# Patient Record
Sex: FEMALE | Race: WHITE | NOT HISPANIC OR LATINO
[De-identification: names, ages, dates, MRNs, and addresses within clinical notes are randomized per-mention and may not be internally consistent; named-entity substitution may affect disease eponyms.]

---

## 2020-10-06 PROBLEM — Z00.00 ENCOUNTER FOR PREVENTIVE HEALTH EXAMINATION: Status: ACTIVE | Noted: 2020-10-06

## 2020-10-19 ENCOUNTER — APPOINTMENT (OUTPATIENT)
Dept: RADIATION ONCOLOGY | Facility: CLINIC | Age: 70
End: 2020-10-19
Payer: MEDICARE

## 2020-10-19 VITALS
TEMPERATURE: 98 F | HEIGHT: 59 IN | RESPIRATION RATE: 12 BRPM | SYSTOLIC BLOOD PRESSURE: 134 MMHG | OXYGEN SATURATION: 99 % | HEART RATE: 76 BPM | DIASTOLIC BLOOD PRESSURE: 84 MMHG | WEIGHT: 169 LBS | BODY MASS INDEX: 34.07 KG/M2

## 2020-10-19 DIAGNOSIS — Z86.79 PERSONAL HISTORY OF OTHER DISEASES OF THE CIRCULATORY SYSTEM: ICD-10-CM

## 2020-10-19 DIAGNOSIS — Z78.9 OTHER SPECIFIED HEALTH STATUS: ICD-10-CM

## 2020-10-19 DIAGNOSIS — Z80.3 FAMILY HISTORY OF MALIGNANT NEOPLASM OF BREAST: ICD-10-CM

## 2020-10-19 DIAGNOSIS — Z86.39 PERSONAL HISTORY OF OTHER ENDOCRINE, NUTRITIONAL AND METABOLIC DISEASE: ICD-10-CM

## 2020-10-19 DIAGNOSIS — Z87.828 PERSONAL HISTORY OF OTHER (HEALED) PHYSICAL INJURY AND TRAUMA: ICD-10-CM

## 2020-10-19 DIAGNOSIS — Z87.891 PERSONAL HISTORY OF NICOTINE DEPENDENCE: ICD-10-CM

## 2020-10-19 PROCEDURE — 99204 OFFICE O/P NEW MOD 45 MIN: CPT

## 2020-10-19 NOTE — VITALS
[Least Pain Intensity: 0/10] : 0/10 [Maximal Pain Intensity: 0/10] : 0/10 [70: Cares for self; unalbe to carry on normal activity or do active work.] : 70: Cares for self; unable to carry on normal activity or do active work. [ECOG Performance Status: 2 - Ambulatory and capable of all self care but unable to carry out any work activities] : Performance Status: 2 - Ambulatory and capable of all self care but unable to carry out any work activities. Up and about more than 50% of waking hours [80: Active, but tires more quickly] : 80: Active, but tires more quickly [Date: ____________] : Patient's last distress assessment performed on [unfilled]. [1 - Distress Level] : Distress Level: 1

## 2020-10-19 NOTE — REVIEW OF SYSTEMS
[Constipation: Grade 0] : Constipation: Grade 0 [Diarrhea: Grade 0] : Diarrhea: Grade 0 [Nausea: Grade 0] : Nausea: Grade 0 [Vomiting: Grade 0] : Vomiting: Grade 0 [Hematuria: Grade 0] : Hematuria: Grade 0 [Urinary Incontinence: Grade 0] : Urinary Incontinence: Grade 0  [Urinary Retention: Grade 0] : Urinary Retention: Grade 0 [Urinary Urgency: Grade 0] : Urinary Urgency: Grade 0 [Urinary Tract Pain: Grade 0] : Urinary Tract Pain: Grade 0 [Urinary Frequency: Grade 0] : Urinary Frequency: Grade 0

## 2020-10-20 NOTE — PHYSICAL EXAM
[Normal External Genitalia] : normal external genitalia  [Normal] : oriented to person, place and time, the affect was normal, the mood was normal and not anxious [FreeTextEntry1] : No mass palpable in the rectovaginal septum [de-identified] : Vaginal mucosa looks healthy.  No lesions palpable near the apex or on the sidewalls.  No pelvic mass palpable.  No blood on examining finger

## 2020-10-20 NOTE — HISTORY OF PRESENT ILLNESS
[FreeTextEntry1] : Ms. Mancini is a 70 year old female referred here by Dr. Wertheim with high-grade serous carcinoma of the endometrium. The patient presented to Dr. Hoff in August of 2020 with post menopausal vaginal bleeding. She had an endometrial biopsy done by Dr. Hoff on 8/20/2020 which revealed endometrial adenocarcinoma with papillary serous and focal clear cell cytoplasm features. She was then referred to Dr. Wertheim and had a robotic CARMEN/BSO on 9/15/2020. \par \par She had a Robotic CARMEN/BSO, staging on 9/15/2020\par Tumor Site:  Endometrium\par Tumor Size(cm):  Multiple fragments, the largest 1.0 cm\par Histologic Type:  Serous carcinoma\par Histologic Grade:  N/A\par Myometrial Invasion:  Not identified\par Uterine Serosa Involvement:  Not identified\par Lower Uterine Segment Involvement:  Not identified\par Cervical Stromal Involvement:  Not identified\par Parametrial Involvement:  Not identified\par Other Tissue/Organ Involvement:  Detached fragments of serous carcinoma are seen in the lumens of both fallopian tubes (Intraluminal tumor cells).\par \par Pelvic Washings:  Positive ()\par Margins:  Negative\par Lymphovascular Invasion:  Not identified\par Mismatch Repair Testing (MMR) by IHC:  Not performed as there are only small fragments of carcinoma, the largest with necrosis.  Recommend doing MMR testing on patient’s previous endometrial biopsy.\par \par Regional Lymph Nodes:  All Lymph Nodes negative for tumor cells\par Number of Lymph Nodes Examined:  7\par Total Number of Pelvic Nodes Examined:  6\par Number of Pelvic [place] Nodes Examined:  6\par Total Number of Para-aortic Nodes Examined:  1\par Number of Para-aortic [place] Nodes Examined:  1\par \par Distant Metastasis confirmed pathologically:  Negative Omentum\par \par Additional Pathologic Findings:  Benign endometrial polyp; Uterine leiomyomas\par \par Pathologic Staging (pTNM,AJCC 8th ed):  pT1a N0(sn) Stage IA\par Positive pelvic washings and intraluminal involvement of fallopian tubes does not alter staging.\par \par FINAL PATHOLOGIC DIAGNOSIS\par A. UTERUS, CERVIX, BILATERAL FALLOPIAN TUBES AND OVARIES (72 GRAMS):  ROBOTIC TOTAL ABDOMINAL HYSTERECTOMY WITH BILATERAL SALPINGO-OOPHORECTOMIES\par -  SEROUS CARCINOMA OF THE ENDOMETRIUM\par 	-  HIGH GRADE\par -  SEVERAL SMALL FOCI DETACHED WITHIN THE ENDOMETRIAL CAVITY, WITHIN THE LUMEN OF BOTH FALLOPIAN TUBES (INTRALUMINAL TUMOR CELLS) AND SEEN LOOSELY ADHERENT TO THE ENDOMETRIUM MICROSCOPICALLY\par -  LARGEST FOCUS (10 MM) IS DETACHED IN THE ENDOMETRIAL CAVITY AND SHOWS EXTENSIVE NECROSIS\par -  REMAINING FOCI RANGE FROM 1 MM UP TO 3 MM\par -  NEGATIVE FOR MYOMETRIAL INVASION\par -  NEGATIVE FOR ENDOCERVICAL INVOLVEMENT\par -  NEGATIVE FOR LYMPHOVASCULAR INVASION\par -  P53 IMMUNOHISTOCHEMICAL STAIN IS POSITIVE, SUPPORTING THE DIAGNOSIS\par -  MISMATCH REPAIR (MMR) TESTING IS BEST DONE ON THE PATIENT’S ENDOMETRIAL BIOPSY SPECIMEN ([place] [id number]) AS THE RESIDUAL FOCI IN THE CURRENT SPECIMEN ARE SMALL AND SOME ARE NECROTIC\par -  BILATERAL FALLOPIAN TUBES WITH INTRALUMINAL SEROUS CARCINOMA (INTRALUMINAL TUMOR CELLS), CONSISTENT WITH ENDOMETRIAL ORIGIN, THE LARGEST FRAGMENT IS 1.5 MM\par -  PELVIC WASHINGS ():  POSITIVE\par 	-  BILATERAL PARAMETRIAL TISSUE, NEGATIVE FOR CARCINOMA\par 	-  BENIGN ENDOMETRIAL POLYP (1.1 CM)\par 	-  INACTIVE ENDOMETRIUM WITH CYSTIC CHANGE\par 	-  UTERINE LEIOMYOMAS, INTRAMURAL AND SUBSEROSAL, THE LARGEST 2 CM\par -  BILATERAL BENIGN OVARIES\par -  BENIGN CERVIX WITH SMALL MUCOUS CYSTS\par \par B. OMENTUM: OMENTECTOMY\par -  OMENTAL TISSUE, NEGATIVE FOR CARCINOMA\par \par C. LEFT EXTERNAL ILIAC SENTINEL NODES:\par -  3 LYMPH NODES, NEGATIVE FOR CARCINOMA ON H&E AND CYTOKERATIN STAINS (0/3)\par D. RIGHT PELVIC SENTINEL NODES:\par -  3 LYMPH NODES, NEGATIVE FOR CARCINOMA ON H&E AND CYTOKERATIN STAINS (0/3) \par E. RIGHT PARA-AORTIC SENTINEL NODES:\par -  1 LYMPH NODE, NEGATIVE FOR CARCINOMA ON H&E AND CYTOKERATIN STAINS (0/1)\par  \par Cancer Staging \par  Pathologic Staging (pTNM,AJCC 8th ed):  pT1a N0(sn) Stage IA\par Positive pelvic washings and intraluminal involvement of fallopian tubes does not alter staging. \par \par On 9/4/2020 she had a CT of her chest, abdomen, and pelvis which revealed no CT evidence of metastatic disease. No lymphadenopathy or ascites. Minimal reticular nodular densities and ground glass changes left upper lobe and superior segment left lower lobe, most likely infectious inflammatory. Incidental cholelithiasis, punctate nodule in the upper pole renal calculi. \par \par She saw Dr. Dumont on 10/9/2020. She recommended 6 cycles of carboplatin and paclitaxel. Her Mother's has Ashkenazi [employer] Heritage, she has been referred to genetic counseling. \par \par She is here to discuss vaginal cuff brachytherapy. Per Dr. Wertheim's note the patient may be getting a second opinion from [facility] Dr. Gil per her families request. \par \par She denies bleeding post surgery, other than some vaginal spotting. Denies urinary or bowel issues.

## 2020-10-20 NOTE — OB/GYN HISTORY
[History of Birth Control Pills] : Patient has a history of taking birth control pills [Menopause Age: ____] : patient was [unfilled] years old at menopause [___] : Living: [unfilled] [History of Hormone Replacement Therapy] : no history of hormone replacement therapy

## 2020-10-27 ENCOUNTER — APPOINTMENT (OUTPATIENT)
Dept: HEMATOLOGY ONCOLOGY | Facility: CLINIC | Age: 70
End: 2020-10-27
Payer: MEDICARE

## 2020-10-27 PROCEDURE — 99499A: CUSTOM | Mod: NC

## 2020-10-28 ENCOUNTER — TRANSCRIPTION ENCOUNTER (OUTPATIENT)
Age: 70
End: 2020-10-28

## 2020-11-11 ENCOUNTER — NON-APPOINTMENT (OUTPATIENT)
Age: 70
End: 2020-11-11

## 2020-12-18 ENCOUNTER — APPOINTMENT (OUTPATIENT)
Dept: RADIATION ONCOLOGY | Facility: CLINIC | Age: 70
End: 2020-12-18
Payer: MEDICARE

## 2020-12-18 VITALS
RESPIRATION RATE: 12 BRPM | DIASTOLIC BLOOD PRESSURE: 78 MMHG | BODY MASS INDEX: 32.66 KG/M2 | SYSTOLIC BLOOD PRESSURE: 120 MMHG | TEMPERATURE: 98 F | HEIGHT: 59 IN | OXYGEN SATURATION: 99 % | HEART RATE: 78 BPM | WEIGHT: 162 LBS

## 2020-12-18 PROCEDURE — 99213 OFFICE O/P EST LOW 20 MIN: CPT | Mod: 25

## 2020-12-18 NOTE — REVIEW OF SYSTEMS
[Anal Pain: Grade 0] : Anal Pain: Grade 0 [Constipation: Grade 1 - Occasional or intermittent symptoms; occasional use of stool softeners, laxatives, dietary modification, or enema] : Constipation: Grade 1 - Occasional or intermittent symptoms; occasional use of stool softeners, laxatives, dietary modification, or enema [Diarrhea: Grade 1 - Increase of <4 stools per day over baseline; mild increase in ostomy output compared to baseline] : Diarrhea: Grade 1 - Increase of <4 stools per day over baseline; mild increase in ostomy output compared to baseline [Dyspepsia: Grade 0] : Dyspepsia: Grade 0 [Dysphagia: Grade 0] : Dysphagia: Grade 0 [Esophagitis: Grade 0] : Esophagitis: Grade 0 [Fecal Incontinence: Grade 0] : Fecal Incontinence: Grade 0 [Gastroparesis: Grade 0] : Gastroparesis: Grade 0 [Nausea: Grade 0] : Nausea: Grade 0 [Proctitis: Grade 0] : Proctitis: Grade 0 [Rectal Pain: Grade 0] : Rectal Pain: Grade 0 [Small Intestinal Obstruction: Grade 0] : Small Intestinal Obstruction: Grade 0 [Vomiting: Grade 0] : Vomiting: Grade 0 [Fatigue: Grade 2 - Fatigue not relieved by rest; limiting instrumental ADL] : Fatigue: Grade 2 - Fatigue not relieved by rest; limiting instrumental ADL [Hematuria: Grade 0] : Hematuria: Grade 0 [Urinary Incontinence: Grade 0] : Urinary Incontinence: Grade 0  [Urinary Retention: Grade 0] : Urinary Retention: Grade 0 [Urinary Tract Pain: Grade 0] : Urinary Tract Pain: Grade 0 [Urinary Urgency: Grade 0] : Urinary Urgency: Grade 0 [Urinary Frequency: Grade 0] : Urinary Frequency: Grade 0 [Lethargy: Grade 0] : Lethargy: Grade 0 [Anxiety: Grade 0] : Anxiety: Grade 0  [Depression: Grade 0] : Depression: Grade 0 [Insomnia: Grade 0] : Insomnia: Grade 0 [FreeTextEntry3] : referred to [facility]

## 2020-12-18 NOTE — HISTORY OF PRESENT ILLNESS
[FreeTextEntry1] : Ms. Mancini is a 70 year old female referred here by Dr. Wertheim with high-grade serous carcinoma of the endometrium. The patient presented to Dr. Hoff in August of 2020 with post menopausal vaginal bleeding. She had an endometrial biopsy done by Dr. Hoff on 8/20/2020 which revealed endometrial adenocarcinoma with papillary serous and focal clear cell cytoplasm features. She was then referred to Dr. Wertheim and had a robotic CARMEN/BSO on 9/15/2020. \par \par She had a Robotic CARMEN/BSO, staging on 9/15/2020\par Tumor Site:  Endometrium\par Tumor Size(cm):  Multiple fragments, the largest 1.0 cm\par Histologic Type:  Serous carcinoma\par Histologic Grade:  N/A\par Myometrial Invasion:  Not identified\par Uterine Serosa Involvement:  Not identified\par Lower Uterine Segment Involvement:  Not identified\par Cervical Stromal Involvement:  Not identified\par Parametrial Involvement:  Not identified\par Other Tissue/Organ Involvement:  Detached fragments of serous carcinoma are seen in the lumens of both fallopian tubes (Intraluminal tumor cells).\par \par Pelvic Washings:  Positive ()\par Margins:  Negative\par Lymphovascular Invasion:  Not identified\par Mismatch Repair Testing (MMR) by IHC:  Not performed as there are only small fragments of carcinoma, the largest with necrosis.  Recommend doing MMR testing on patient’s previous endometrial biopsy.\par \par Regional Lymph Nodes:  All Lymph Nodes negative for tumor cells\par Number of Lymph Nodes Examined:  7\par Total Number of Pelvic Nodes Examined:  6\par Number of Pelvic [place] Nodes Examined:  6\par Total Number of Para-aortic Nodes Examined:  1\par Number of Para-aortic [place] Nodes Examined:  1\par \par Distant Metastasis confirmed pathologically:  Negative Omentum\par \par Additional Pathologic Findings:  Benign endometrial polyp; Uterine leiomyomas\par \par Pathologic Staging (pTNM,AJCC 8th ed):  pT1a N0(sn) Stage IA\par Positive pelvic washings and intraluminal involvement of fallopian tubes does not alter staging.\par \par FINAL PATHOLOGIC DIAGNOSIS\par A. UTERUS, CERVIX, BILATERAL FALLOPIAN TUBES AND OVARIES (72 GRAMS):  ROBOTIC TOTAL ABDOMINAL HYSTERECTOMY WITH BILATERAL SALPINGO-OOPHORECTOMIES\par -  SEROUS CARCINOMA OF THE ENDOMETRIUM\par 	-  HIGH GRADE\par -  SEVERAL SMALL FOCI DETACHED WITHIN THE ENDOMETRIAL CAVITY, WITHIN THE LUMEN OF BOTH FALLOPIAN TUBES (INTRALUMINAL TUMOR CELLS) AND SEEN LOOSELY ADHERENT TO THE ENDOMETRIUM MICROSCOPICALLY\par -  LARGEST FOCUS (10 MM) IS DETACHED IN THE ENDOMETRIAL CAVITY AND SHOWS EXTENSIVE NECROSIS\par -  REMAINING FOCI RANGE FROM 1 MM UP TO 3 MM\par -  NEGATIVE FOR MYOMETRIAL INVASION\par -  NEGATIVE FOR ENDOCERVICAL INVOLVEMENT\par -  NEGATIVE FOR LYMPHOVASCULAR INVASION\par -  P53 IMMUNOHISTOCHEMICAL STAIN IS POSITIVE, SUPPORTING THE DIAGNOSIS\par -  MISMATCH REPAIR (MMR) TESTING IS BEST DONE ON THE PATIENT’S ENDOMETRIAL BIOPSY SPECIMEN ([place] [id number]) AS THE RESIDUAL FOCI IN THE CURRENT SPECIMEN ARE SMALL AND SOME ARE NECROTIC\par -  BILATERAL FALLOPIAN TUBES WITH INTRALUMINAL SEROUS CARCINOMA (INTRALUMINAL TUMOR CELLS), CONSISTENT WITH ENDOMETRIAL ORIGIN, THE LARGEST FRAGMENT IS 1.5 MM\par -  PELVIC WASHINGS ():  POSITIVE\par 	-  BILATERAL PARAMETRIAL TISSUE, NEGATIVE FOR CARCINOMA\par 	-  BENIGN ENDOMETRIAL POLYP (1.1 CM)\par 	-  INACTIVE ENDOMETRIUM WITH CYSTIC CHANGE\par 	-  UTERINE LEIOMYOMAS, INTRAMURAL AND SUBSEROSAL, THE LARGEST 2 CM\par -  BILATERAL BENIGN OVARIES\par -  BENIGN CERVIX WITH SMALL MUCOUS CYSTS\par \par B. OMENTUM: OMENTECTOMY\par -  OMENTAL TISSUE, NEGATIVE FOR CARCINOMA\par \par C. LEFT EXTERNAL ILIAC SENTINEL NODES:\par -  3 LYMPH NODES, NEGATIVE FOR CARCINOMA ON H&E AND CYTOKERATIN STAINS (0/3)\par D. RIGHT PELVIC SENTINEL NODES:\par -  3 LYMPH NODES, NEGATIVE FOR CARCINOMA ON H&E AND CYTOKERATIN STAINS (0/3) \par E. RIGHT PARA-AORTIC SENTINEL NODES:\par -  1 LYMPH NODE, NEGATIVE FOR CARCINOMA ON H&E AND CYTOKERATIN STAINS (0/1)\par  \par Cancer Staging \par  Pathologic Staging (pTNM,AJCC 8th ed):  pT1a N0(sn) Stage IA\par Positive pelvic washings and intraluminal involvement of fallopian tubes does not alter staging. \par \par On 9/4/2020 she had a CT of her chest, abdomen, and pelvis which revealed no CT evidence of metastatic disease. No lymphadenopathy or ascites. Minimal reticular nodular densities and ground glass changes left upper lobe and superior segment left lower lobe, most likely infectious inflammatory. Incidental cholelithiasis, punctate nodule in the upper pole renal calculi. \par \par She saw Dr. Dumont on 10/9/2020. She recommended 6 cycles of carboplatin and paclitaxel. Her Mother's has Ashkenazi Confuc[employer]ism Heritage, she has been referred to genetic counseling. \par \par She is here to discuss vaginal cuff brachytherapy. Per Dr. Wertheim's note the patient may be getting a second opinion from [facility] Dr. Gil per her families request. She denies bleeding post surgery, other than some vaginal spotting. Denies urinary or bowel issues. \par \par \par 12/18/2020\par Patient was last seen in the office on 10/19/2020 at which time the impression and plan was as follows:\par Stage Ia adenocarcinoma of endometrium, status post CARMEN/BSO procedure. We had a long discussion with the patient regarding the management. We reviewed the pathology that showed a high-grade serous carcinoma with implants noted in fallopian tube as well as in the cavity of uterus. Given her pathology, we recommended that treating her with systemic chemotherapy to improve survival. We also favored using high-dose-rate brachytherapy to the upper part of the vagina to decrease local recurrence. We recommended sandwiching the radiation therapy in between her third and fourth cycle of systemic chemotherapy. We discussed the technical details including the need for insertion of customized applicator, CT simulation, treatment planning, verification and delivery. We recommended treating her to a dose of 2100 cGy in 3 fractions. We discussed the expected short-term side effects including burning sensation, increased frequency of urination and nausea. We discussed the long-term side effects including the risk of vaginal shortening, injury to the bladder, rectal ulceration and subacute intestinal obstruction. We indicated a 1 to 2% probability of grade 3 or higher delayed toxicity. \par \par \par She has completed 3 cycles of chemotherapy,carboplatin and paclitaxel, with Dr. Dumont and she is here to get started on radiation prior to completing her next 3 cycles of chemotherapy. She has been tolerating the chemotherapy well, she complains of increased fatigue. Denies pain. She alternates between diarrhea and constipation. She denies any urinary issues. No BLE edema present. Denies any N/V at this time.

## 2020-12-18 NOTE — PHYSICAL EXAM
[Normal] : oriented to person, place and time, the affect was normal, the mood was normal and not anxious [de-identified] : External genitalia looks normal.  No lesions noted in the vagina.  No parametrial mass noted.  No blood in examining finger

## 2021-03-12 ENCOUNTER — APPOINTMENT (OUTPATIENT)
Dept: RADIATION ONCOLOGY | Facility: CLINIC | Age: 71
End: 2021-03-12

## 2021-03-23 ENCOUNTER — APPOINTMENT (OUTPATIENT)
Dept: RADIATION ONCOLOGY | Facility: CLINIC | Age: 71
End: 2021-03-23
Payer: MEDICARE

## 2021-03-23 VITALS
OXYGEN SATURATION: 100 % | HEART RATE: 72 BPM | RESPIRATION RATE: 14 BRPM | HEIGHT: 59 IN | DIASTOLIC BLOOD PRESSURE: 86 MMHG | SYSTOLIC BLOOD PRESSURE: 140 MMHG | WEIGHT: 151 LBS | TEMPERATURE: 98 F | BODY MASS INDEX: 30.44 KG/M2

## 2021-03-23 PROCEDURE — 99212 OFFICE O/P EST SF 10 MIN: CPT

## 2021-03-23 NOTE — PHYSICAL EXAM
[Normal External Genitalia] : normal external genitalia  [Normal Vaginal Cuff] : vaginal cuff without lesion or nodularity [Normal] : oriented to person, place and time, the affect was normal, the mood was normal and not anxious [FreeTextEntry1] : Rectal mucosa smooth no mass palpable in the rectovaginal septum.  No blood in examining finger [de-identified] : No pelvic mass noted.  No blood in examining finger

## 2021-03-23 NOTE — HISTORY OF PRESENT ILLNESS
[FreeTextEntry1] : Ms. Mancini is a 70 year old female referred here by Dr. Wertheim with high-grade serous carcinoma of the endometrium. The patient presented to Dr. Hoff in August of 2020 with post menopausal vaginal bleeding. She had an endometrial biopsy done by Dr. Hoff on 8/20/2020 which revealed endometrial adenocarcinoma with papillary serous and focal clear cell cytoplasm features. She was then referred to Dr. Wertheim and had a robotic CARMEN/BSO on 9/15/2020. \par \par She had a Robotic CARMEN/BSO, staging on 9/15/2020\par Tumor Site:  Endometrium\par Tumor Size(cm):  Multiple fragments, the largest 1.0 cm\par Histologic Type:  Serous carcinoma\par Histologic Grade:  N/A\par Myometrial Invasion:  Not identified\par Uterine Serosa Involvement:  Not identified\par Lower Uterine Segment Involvement:  Not identified\par Cervical Stromal Involvement:  Not identified\par Parametrial Involvement:  Not identified\par Other Tissue/Organ Involvement:  Detached fragments of serous carcinoma are seen in the lumens of both fallopian tubes (Intraluminal tumor cells).\par \par Pelvic Washings:  Positive ()\par Margins:  Negative\par Lymphovascular Invasion:  Not identified\par Mismatch Repair Testing (MMR) by IHC:  Not performed as there are only small fragments of carcinoma, the largest with necrosis.  Recommend doing MMR testing on patient’s previous endometrial biopsy.\par \par Regional Lymph Nodes:  All Lymph Nodes negative for tumor cells\par Number of Lymph Nodes Examined:  7\par Total Number of Pelvic Nodes Examined:  6\par Number of Pelvic [place] Nodes Examined:  6\par Total Number of Para-aortic Nodes Examined:  1\par Number of Para-aortic [place] Nodes Examined:  1\par \par Distant Metastasis confirmed pathologically:  Negative Omentum\par \par Additional Pathologic Findings:  Benign endometrial polyp; Uterine leiomyomas\par \par Pathologic Staging (pTNM,AJCC 8th ed):  pT1a N0(sn) Stage IA\par Positive pelvic washings and intraluminal involvement of fallopian tubes does not alter staging.\par \par FINAL PATHOLOGIC DIAGNOSIS\par A. UTERUS, CERVIX, BILATERAL FALLOPIAN TUBES AND OVARIES (72 GRAMS):  ROBOTIC TOTAL ABDOMINAL HYSTERECTOMY WITH BILATERAL SALPINGO-OOPHORECTOMIES\par -  SEROUS CARCINOMA OF THE ENDOMETRIUM\par 	-  HIGH GRADE\par -  SEVERAL SMALL FOCI DETACHED WITHIN THE ENDOMETRIAL CAVITY, WITHIN THE LUMEN OF BOTH FALLOPIAN TUBES (INTRALUMINAL TUMOR CELLS) AND SEEN LOOSELY ADHERENT TO THE ENDOMETRIUM MICROSCOPICALLY\par -  LARGEST FOCUS (10 MM) IS DETACHED IN THE ENDOMETRIAL CAVITY AND SHOWS EXTENSIVE NECROSIS\par -  REMAINING FOCI RANGE FROM 1 MM UP TO 3 MM\par -  NEGATIVE FOR MYOMETRIAL INVASION\par -  NEGATIVE FOR ENDOCERVICAL INVOLVEMENT\par -  NEGATIVE FOR LYMPHOVASCULAR INVASION\par -  P53 IMMUNOHISTOCHEMICAL STAIN IS POSITIVE, SUPPORTING THE DIAGNOSIS\par -  MISMATCH REPAIR (MMR) TESTING IS BEST DONE ON THE PATIENT’S ENDOMETRIAL BIOPSY SPECIMEN ([place] [id number]) AS THE RESIDUAL FOCI IN THE CURRENT SPECIMEN ARE SMALL AND SOME ARE NECROTIC\par -  BILATERAL FALLOPIAN TUBES WITH INTRALUMINAL SEROUS CARCINOMA (INTRALUMINAL TUMOR CELLS), CONSISTENT WITH ENDOMETRIAL ORIGIN, THE LARGEST FRAGMENT IS 1.5 MM\par -  PELVIC WASHINGS ():  POSITIVE\par 	-  BILATERAL PARAMETRIAL TISSUE, NEGATIVE FOR CARCINOMA\par 	-  BENIGN ENDOMETRIAL POLYP (1.1 CM)\par 	-  INACTIVE ENDOMETRIUM WITH CYSTIC CHANGE\par 	-  UTERINE LEIOMYOMAS, INTRAMURAL AND SUBSEROSAL, THE LARGEST 2 CM\par -  BILATERAL BENIGN OVARIES\par -  BENIGN CERVIX WITH SMALL MUCOUS CYSTS\par \par B. OMENTUM: OMENTECTOMY\par -  OMENTAL TISSUE, NEGATIVE FOR CARCINOMA\par \par C. LEFT EXTERNAL ILIAC SENTINEL NODES:\par -  3 LYMPH NODES, NEGATIVE FOR CARCINOMA ON H&E AND CYTOKERATIN STAINS (0/3)\par D. RIGHT PELVIC SENTINEL NODES:\par -  3 LYMPH NODES, NEGATIVE FOR CARCINOMA ON H&E AND CYTOKERATIN STAINS (0/3) \par E. RIGHT PARA-AORTIC SENTINEL NODES:\par -  1 LYMPH NODE, NEGATIVE FOR CARCINOMA ON H&E AND CYTOKERATIN STAINS (0/1)\par  \par Cancer Staging \par  Pathologic Staging (pTNM,AJCC 8th ed):  pT1a N0(sn) Stage IA\par Positive pelvic washings and intraluminal involvement of fallopian tubes does not alter staging. \par \par On 9/4/2020 she had a CT of her chest, abdomen, and pelvis which revealed no CT evidence of metastatic disease. No lymphadenopathy or ascites. Minimal reticular nodular densities and ground glass changes left upper lobe and superior segment left lower lobe, most likely infectious inflammatory. Incidental cholelithiasis, punctate nodule in the upper pole renal calculi. \par \par She saw Dr. Dumont on 10/9/2020. She recommended 6 cycles of carboplatin and paclitaxel. Her Mother's has [employer] Heritage, she has been referred to genetic counseling. \par \par She is here to discuss vaginal cuff brachytherapy. Per Dr. Wertheim's note the patient may be getting a second opinion from [facility] Dr. Gil per her families request. She denies bleeding post surgery, other than some vaginal spotting. Denies urinary or bowel issues. \par \par \par 12/18/2020\par Patient was last seen in the office on 10/19/2020 at which time the impression and plan was as follows:\par Stage Ia adenocarcinoma of endometrium, status post CARMEN/BSO procedure. We had a long discussion with the patient regarding the management. We reviewed the pathology that showed a high-grade serous carcinoma with implants noted in fallopian tube as well as in the cavity of uterus. Given her pathology, we recommended that treating her with systemic chemotherapy to improve survival. We also favored using high-dose-rate brachytherapy to the upper part of the vagina to decrease local recurrence. We recommended sandwiching the radiation therapy in between her third and fourth cycle of systemic chemotherapy. We discussed the technical details including the need for insertion of customized applicator, CT simulation, treatment planning, verification and delivery. We recommended treating her to a dose of 2100 cGy in 3 fractions. We discussed the expected short-term side effects including burning sensation, increased frequency of urination and nausea. We discussed the long-term side effects including the risk of vaginal shortening, injury to the bladder, rectal ulceration and subacute intestinal obstruction. We indicated a 1 to 2% probability of grade 3 or higher delayed toxicity. \par \par She has completed 3 cycles of chemotherapy,carboplatin and paclitaxel, with Dr. Dumont and she is here to get started on radiation prior to completing her next 3 cycles of chemotherapy. She has been tolerating the chemotherapy well, she complains of increased fatigue. Denies pain. She alternates between diarrhea and constipation. She denies any urinary issues. No BLE edema present. Denies any N/V at this time. \par \par 3/23/2021\par She completed 3 fractions, HDR 2100cGy on 1/19/21, she is here for a follow up.\par  Dr. Dumont and chemotherapy completed 4 weeks ago. She completed 6 cycles. She states she is feeling very weak and tires. The nausea and vomiting has subsided. She denies any pain, vaginal discharge or bleeding and urinary symptoms  She has a good appetite and is hydrating well. She is seeing Dr. Dumont on 4/9/2021\par

## 2021-03-23 NOTE — REVIEW OF SYSTEMS
[Nausea: Grade 0] : Nausea: Grade 0 [Vomiting: Grade 0] : Vomiting: Grade 0 [Fatigue: Grade 2 - Fatigue not relieved by rest; limiting instrumental ADL] : Fatigue: Grade 2 - Fatigue not relieved by rest; limiting instrumental ADL [Hematuria: Grade 0] : Hematuria: Grade 0 [Urinary Tract Pain: Grade 0] : Urinary Tract Pain: Grade 0 [Urinary Frequency: Grade 0] : Urinary Frequency: Grade 0 [Genital Edema: Grade 0] : Genital Edema: Grade 0 [Vaginal Infection: Grade 0] : Vaginal Infection: Grade 0

## 2021-03-23 NOTE — VITALS
[Maximal Pain Intensity: 0/10] : 0/10 [Least Pain Intensity: 0/10] : 0/10 [NoTreatment Scheduled] : no treatment scheduled [70: Cares for self; unalbe to carry on normal activity or do active work.] : 70: Cares for self; unable to carry on normal activity or do active work. [ECOG Performance Status: 2 - Ambulatory and capable of all self care but unable to carry out any work activities] : Performance Status: 2 - Ambulatory and capable of all self care but unable to carry out any work activities. Up and about more than 50% of waking hours

## 2021-09-14 ENCOUNTER — APPOINTMENT (OUTPATIENT)
Dept: RADIATION ONCOLOGY | Facility: CLINIC | Age: 71
End: 2021-09-14
Payer: MEDICARE

## 2021-09-14 VITALS
HEART RATE: 66 BPM | OXYGEN SATURATION: 99 % | DIASTOLIC BLOOD PRESSURE: 74 MMHG | TEMPERATURE: 98 F | SYSTOLIC BLOOD PRESSURE: 154 MMHG | WEIGHT: 150 LBS | BODY MASS INDEX: 30.24 KG/M2 | RESPIRATION RATE: 12 BRPM | HEIGHT: 59 IN

## 2021-09-14 PROCEDURE — 99212 OFFICE O/P EST SF 10 MIN: CPT

## 2021-09-14 RX ORDER — SEMAGLUTIDE 1.34 MG/ML
2 INJECTION, SOLUTION SUBCUTANEOUS
Refills: 0 | Status: ACTIVE | COMMUNITY

## 2021-09-14 RX ORDER — INSULIN HUMAN 100 [IU]/ML
INJECTION, SOLUTION PARENTERAL
Refills: 0 | Status: ACTIVE | COMMUNITY

## 2021-09-14 RX ORDER — LOSARTAN POTASSIUM 100 MG/1
TABLET, FILM COATED ORAL
Refills: 0 | Status: ACTIVE | COMMUNITY

## 2021-09-14 RX ORDER — ATORVASTATIN CALCIUM 80 MG/1
TABLET, FILM COATED ORAL
Refills: 0 | Status: ACTIVE | COMMUNITY

## 2021-09-14 RX ORDER — FLUOXETINE HYDROCHLORIDE 20 MG/1
20 CAPSULE ORAL
Refills: 0 | Status: ACTIVE | COMMUNITY

## 2021-09-14 RX ORDER — INSULIN GLARGINE 100 [IU]/ML
100 INJECTION, SOLUTION SUBCUTANEOUS
Refills: 0 | Status: ACTIVE | COMMUNITY

## 2021-09-14 NOTE — DISEASE MANAGEMENT
[Pathological] : TNM Stage: p [I] : I [TTNM] : 1 [NTNM] : 0 [MTNM] : 0 [de-identified] : 3 fractions 2100cGy on 1/19/21

## 2021-09-14 NOTE — HISTORY OF PRESENT ILLNESS
[FreeTextEntry1] : Ms. Mancini is a 70 year old female referred here by Dr. Wertheim with high-grade serous carcinoma of the endometrium. The patient presented to Dr. Hoff in August of 2020 with post menopausal vaginal bleeding. She had an endometrial biopsy done by Dr. Hoff on 8/20/2020 which revealed endometrial adenocarcinoma with papillary serous and focal clear cell cytoplasm features. She was then referred to Dr. Wertheim and had a robotic CARMEN/BSO on 9/15/2020. \par \par She had a Robotic CARMEN/BSO, staging on 9/15/2020\par Tumor Site:  Endometrium\par Tumor Size(cm):  Multiple fragments, the largest 1.0 cm\par Histologic Type:  Serous carcinoma\par Histologic Grade:  N/A\par Myometrial Invasion:  Not identified\par Uterine Serosa Involvement:  Not identified\par Lower Uterine Segment Involvement:  Not identified\par Cervical Stromal Involvement:  Not identified\par Parametrial Involvement:  Not identified\par Other Tissue/Organ Involvement:  Detached fragments of serous carcinoma are seen in the lumens of both fallopian tubes (Intraluminal tumor cells).\par \par Pelvic Washings:  Positive ()\par Margins:  Negative\par Lymphovascular Invasion:  Not identified\par Mismatch Repair Testing (MMR) by IHC:  Not performed as there are only small fragments of carcinoma, the largest with necrosis.  Recommend doing MMR testing on patient’s previous endometrial biopsy.\par \par Regional Lymph Nodes:  All Lymph Nodes negative for tumor cells\par Number of Lymph Nodes Examined:  7\par Total Number of Pelvic Nodes Examined:  6\par Number of Pelvic [place] Nodes Examined:  6\par Total Number of Para-aortic Nodes Examined:  1\par Number of Para-aortic [place] Nodes Examined:  1\par \par Distant Metastasis confirmed pathologically:  Negative Omentum\par \par Additional Pathologic Findings:  Benign endometrial polyp; Uterine leiomyomas\par \par Pathologic Staging (pTNM,AJCC 8th ed):  pT1a N0(sn) Stage IA\par Positive pelvic washings and intraluminal involvement of fallopian tubes does not alter staging.\par \par FINAL PATHOLOGIC DIAGNOSIS\par A. UTERUS, CERVIX, BILATERAL FALLOPIAN TUBES AND OVARIES (72 GRAMS):  ROBOTIC TOTAL ABDOMINAL HYSTERECTOMY WITH BILATERAL SALPINGO-OOPHORECTOMIES\par -  SEROUS CARCINOMA OF THE ENDOMETRIUM\par 	-  HIGH GRADE\par -  SEVERAL SMALL FOCI DETACHED WITHIN THE ENDOMETRIAL CAVITY, WITHIN THE LUMEN OF BOTH FALLOPIAN TUBES (INTRALUMINAL TUMOR CELLS) AND SEEN LOOSELY ADHERENT TO THE ENDOMETRIUM MICROSCOPICALLY\par -  LARGEST FOCUS (10 MM) IS DETACHED IN THE ENDOMETRIAL CAVITY AND SHOWS EXTENSIVE NECROSIS\par -  REMAINING FOCI RANGE FROM 1 MM UP TO 3 MM\par -  NEGATIVE FOR MYOMETRIAL INVASION\par -  NEGATIVE FOR ENDOCERVICAL INVOLVEMENT\par -  NEGATIVE FOR LYMPHOVASCULAR INVASION\par -  P53 IMMUNOHISTOCHEMICAL STAIN IS POSITIVE, SUPPORTING THE DIAGNOSIS\par -  MISMATCH REPAIR (MMR) TESTING IS BEST DONE ON THE PATIENT’S ENDOMETRIAL BIOPSY SPECIMEN ([place] [id number]) AS THE RESIDUAL FOCI IN THE CURRENT SPECIMEN ARE SMALL AND SOME ARE NECROTIC\par -  BILATERAL FALLOPIAN TUBES WITH INTRALUMINAL SEROUS CARCINOMA (INTRALUMINAL TUMOR CELLS), CONSISTENT WITH ENDOMETRIAL ORIGIN, THE LARGEST FRAGMENT IS 1.5 MM\par -  PELVIC WASHINGS ():  POSITIVE\par 	-  BILATERAL PARAMETRIAL TISSUE, NEGATIVE FOR CARCINOMA\par 	-  BENIGN ENDOMETRIAL POLYP (1.1 CM)\par 	-  INACTIVE ENDOMETRIUM WITH CYSTIC CHANGE\par 	-  UTERINE LEIOMYOMAS, INTRAMURAL AND SUBSEROSAL, THE LARGEST 2 CM\par -  BILATERAL BENIGN OVARIES\par -  BENIGN CERVIX WITH SMALL MUCOUS CYSTS\par \par B. OMENTUM: OMENTECTOMY\par -  OMENTAL TISSUE, NEGATIVE FOR CARCINOMA\par \par C. LEFT EXTERNAL ILIAC SENTINEL NODES:\par -  3 LYMPH NODES, NEGATIVE FOR CARCINOMA ON H&E AND CYTOKERATIN STAINS (0/3)\par D. RIGHT PELVIC SENTINEL NODES:\par -  3 LYMPH NODES, NEGATIVE FOR CARCINOMA ON H&E AND CYTOKERATIN STAINS (0/3) \par E. RIGHT PARA-AORTIC SENTINEL NODES:\par -  1 LYMPH NODE, NEGATIVE FOR CARCINOMA ON H&E AND CYTOKERATIN STAINS (0/1)\par  \par Cancer Staging \par  Pathologic Staging (pTNM,AJCC 8th ed):  pT1a N0(sn) Stage IA\par Positive pelvic washings and intraluminal involvement of fallopian tubes does not alter staging. \par \par On 9/4/2020 she had a CT of her chest, abdomen, and pelvis which revealed no CT evidence of metastatic disease. No lymphadenopathy or ascites. Minimal reticular nodular densities and ground glass changes left upper lobe and superior segment left lower lobe, most likely infectious inflammatory. Incidental cholelithiasis, punctate nodule in the upper pole renal calculi. \par \par She saw Dr. Dumont on 10/9/2020. She recommended 6 cycles of carboplatin and paclitaxel. Her Mother's has Ashkenazi Confucianist Heritage, she has been referred to genetic counseling. \par \par She is here to discuss vaginal cuff brachytherapy. Per Dr. Wertheim's note the patient may be getting a second opinion from [facility] Dr. Gil per her families request. She denies bleeding post surgery, other than some vaginal spotting. Denies urinary or bowel issues. \par \par \par 12/18/2020\par Patient was last seen in the office on 10/19/2020 at which time the impression and plan was as follows:\par Stage Ia adenocarcinoma of endometrium, status post CARMEN/BSO procedure. We had a long discussion with the patient regarding the management. We reviewed the pathology that showed a high-grade serous carcinoma with implants noted in fallopian tube as well as in the cavity of uterus. Given her pathology, we recommended that treating her with systemic chemotherapy to improve survival. We also favored using high-dose-rate brachytherapy to the upper part of the vagina to decrease local recurrence. We recommended sandwiching the radiation therapy in between her third and fourth cycle of systemic chemotherapy. We discussed the technical details including the need for insertion of customized applicator, CT simulation, treatment planning, verification and delivery. We recommended treating her to a dose of 2100 cGy in 3 fractions. We discussed the expected short-term side effects including burning sensation, increased frequency of urination and nausea. We discussed the long-term side effects including the risk of vaginal shortening, injury to the bladder, rectal ulceration and subacute intestinal obstruction. We indicated a 1 to 2% probability of grade 3 or higher delayed toxicity. \par \par She has completed 3 cycles of chemotherapy,carboplatin and paclitaxel, with Dr. Dumont and she is here to get started on radiation prior to completing her next 3 cycles of chemotherapy. She has been tolerating the chemotherapy well, she complains of increased fatigue. Denies pain. She alternates between diarrhea and constipation. She denies any urinary issues. No BLE edema present. Denies any N/V at this time. \par \par 3/23/2021\par She completed 3 fractions, HDR 2100cGy on 1/19/21, she is here for a follow up.\par  Dr. Dumont and chemotherapy completed 4 weeks ago. She completed 6 cycles. She states she is feeling very weak and tires. The nausea and vomiting has subsided. She denies any pain, vaginal discharge or bleeding and urinary symptoms  She has a good appetite and is hydrating well. She is seeing Dr. Dumont on 4/9/2021\par \par 9/14/2021\par Ms. Mancini presents today for a f/u.  Stage I high-grade serous endometrial cancer, status post CARMEN/BSO 6 cycles of chemotherapy and vaginal cuff brachytherapy completed on 1/19/2021. She was last seen in the office on 3/2021 with no evidence of disease. She is going to have a CT of the chest soon. She saw Dr. Dumont on 7/13/21 who is monitoring her CA-125 levels. She has not followed up with GYN oncology. She denies any pain, vaginal discharge, bleeding, urinary, or bowel symptoms. She eating and drinking well. She is not sexually active. \par

## 2021-09-14 NOTE — PHYSICAL EXAM
[Normal External Genitalia] : normal external genitalia  [Normal] : oriented to person, place and time, the affect was normal, the mood was normal and not anxious [FreeTextEntry1] : Rectal mucosa smooth.  No mass palpable in the rectovaginal septum [de-identified] : Vagina narrowed.  Smooth mucosal lining.  No ulceration or bleeding noted.  No pelvic mass palpable no blood on the examining finger

## 2021-09-14 NOTE — REVIEW OF SYSTEMS
[Anal Pain: Grade 0] : Anal Pain: Grade 0 [Constipation: Grade 0] : Constipation: Grade 0 [Diarrhea: Grade 0] : Diarrhea: Grade 0 [Dyspepsia: Grade 0] : Dyspepsia: Grade 0 [Dysphagia: Grade 0] : Dysphagia: Grade 0 [Esophagitis: Grade 0] : Esophagitis: Grade 0 [Fecal Incontinence: Grade 0] : Fecal Incontinence: Grade 0 [Gastroparesis: Grade 0] : Gastroparesis: Grade 0 [Rectal Pain: Grade 0] : Rectal Pain: Grade 0 [Nausea: Grade 0] : Nausea: Grade 0 [Small Intestinal Obstruction: Grade 0] : Small Intestinal Obstruction: Grade 0 [Vomiting: Grade 0] : Vomiting: Grade 0 [Edema Limbs: Grade 0] : Edema Limbs: Grade 0  [Fatigue: Grade 0] : Fatigue: Grade 0 [Hematuria: Grade 0] : Hematuria: Grade 0 [Urinary Incontinence: Grade 0] : Urinary Incontinence: Grade 0  [Urinary Retention: Grade 0] : Urinary Retention: Grade 0 [Urinary Tract Pain: Grade 0] : Urinary Tract Pain: Grade 0 [Urinary Urgency: Grade 0] : Urinary Urgency: Grade 0 [Urinary Frequency: Grade 0] : Urinary Frequency: Grade 0 [Genital Edema: Grade 0] : Genital Edema: Grade 0 [Vaginal Stricture: Grade 0] : Vaginal Stricture: Grade 0 [Vaginal Infection: Grade 0] : Vaginal Infection: Grade 0  [Breast Pain: Grade 0] : Breast Pain: Grade 0 [Dyspareunia: Grade 0] : Dyspareunia: Grade 0 [Dermatitis Radiation: Grade 0] : Dermatitis Radiation: Grade 0

## 2021-09-16 ENCOUNTER — NON-APPOINTMENT (OUTPATIENT)
Age: 71
End: 2021-09-16

## 2022-03-14 ENCOUNTER — APPOINTMENT (OUTPATIENT)
Dept: GYNECOLOGIC ONCOLOGY | Facility: CLINIC | Age: 72
End: 2022-03-14
Payer: MEDICARE

## 2022-03-14 PROCEDURE — 99205 OFFICE O/P NEW HI 60 MIN: CPT

## 2022-08-03 ENCOUNTER — APPOINTMENT (OUTPATIENT)
Dept: GYNECOLOGIC ONCOLOGY | Facility: CLINIC | Age: 72
End: 2022-08-03

## 2022-08-03 PROCEDURE — 99215 OFFICE O/P EST HI 40 MIN: CPT

## 2022-09-22 ENCOUNTER — APPOINTMENT (OUTPATIENT)
Dept: RADIATION ONCOLOGY | Facility: CLINIC | Age: 72
End: 2022-09-22

## 2022-09-22 VITALS
RESPIRATION RATE: 16 BRPM | BODY MASS INDEX: 37.03 KG/M2 | SYSTOLIC BLOOD PRESSURE: 139 MMHG | HEIGHT: 55 IN | HEART RATE: 81 BPM | DIASTOLIC BLOOD PRESSURE: 80 MMHG | WEIGHT: 160 LBS | OXYGEN SATURATION: 100 %

## 2022-09-22 PROCEDURE — 99212 OFFICE O/P EST SF 10 MIN: CPT

## 2022-09-22 NOTE — DISEASE MANAGEMENT
[Pathological] : TNM Stage: p [I] : I [TTNM] : 1 [NTNM] : 0 [MTNM] : 0 [de-identified] : 3 fractions 2100cGy on 1/19/21

## 2022-09-22 NOTE — PHYSICAL EXAM
[Normal External Genitalia] : normal external genitalia  [Normal Vaginal Cuff] : vaginal cuff without lesion or nodularity [Normal] : oriented to person, place and time, the affect was normal, the mood was normal and not anxious [FreeTextEntry1] : No mass palpable in rectovaginal septum.  No blood on the examining finger [de-identified] : No pelvic mass palpable.  No blood on the examining finger

## 2022-09-22 NOTE — HISTORY OF PRESENT ILLNESS
[FreeTextEntry1] : Ms. Mancini is a 70 year old female referred here by Dr. Wertheim with high-grade serous carcinoma of the endometrium. The patient presented to Dr. Hoff in August of 2020 with post menopausal vaginal bleeding. She had an endometrial biopsy done by Dr. Hoff on 8/20/2020 which revealed endometrial adenocarcinoma with papillary serous and focal clear cell cytoplasm features. She was then referred to Dr. Wertheim and had a robotic CARMEN/BSO on 9/15/2020. \par \par She had a Robotic CARMEN/BSO, staging on 9/15/2020\par Tumor Site:  Endometrium\par Tumor Size(cm):  Multiple fragments, the largest 1.0 cm\par Histologic Type:  Serous carcinoma\par Histologic Grade:  N/A\par Myometrial Invasion:  Not identified\par Uterine Serosa Involvement:  Not identified\par Lower Uterine Segment Involvement:  Not identified\par Cervical Stromal Involvement:  Not identified\par Parametrial Involvement:  Not identified\par Other Tissue/Organ Involvement:  Detached fragments of serous carcinoma are seen in the lumens of both fallopian tubes (Intraluminal tumor cells).\par \par Pelvic Washings:  Positive ()\par Margins:  Negative\par Lymphovascular Invasion:  Not identified\par Mismatch Repair Testing (MMR) by IHC:  Not performed as there are only small fragments of carcinoma, the largest with necrosis.  Recommend doing MMR testing on patient’s previous endometrial biopsy.\par \par Regional Lymph Nodes:  All Lymph Nodes negative for tumor cells\par Number of Lymph Nodes Examined:  7\par Total Number of Pelvic Nodes Examined:  6\par Number of Pelvic [place] Nodes Examined:  6\par Total Number of Para-aortic Nodes Examined:  1\par Number of Para-aortic [place] Nodes Examined:  1\par \par Distant Metastasis confirmed pathologically:  Negative Omentum\par \par Additional Pathologic Findings:  Benign endometrial polyp; Uterine leiomyomas\par \par Pathologic Staging (pTNM,AJCC 8th ed):  pT1a N0(sn) Stage IA\par Positive pelvic washings and intraluminal involvement of fallopian tubes does not alter staging.\par \par FINAL PATHOLOGIC DIAGNOSIS\par A. UTERUS, CERVIX, BILATERAL FALLOPIAN TUBES AND OVARIES (72 GRAMS):  ROBOTIC TOTAL ABDOMINAL HYSTERECTOMY WITH BILATERAL SALPINGO-OOPHORECTOMIES\par -  SEROUS CARCINOMA OF THE ENDOMETRIUM\par 	-  HIGH GRADE\par -  SEVERAL SMALL FOCI DETACHED WITHIN THE ENDOMETRIAL CAVITY, WITHIN THE LUMEN OF BOTH FALLOPIAN TUBES (INTRALUMINAL TUMOR CELLS) AND SEEN LOOSELY ADHERENT TO THE ENDOMETRIUM MICROSCOPICALLY\par -  LARGEST FOCUS (10 MM) IS DETACHED IN THE ENDOMETRIAL CAVITY AND SHOWS EXTENSIVE NECROSIS\par -  REMAINING FOCI RANGE FROM 1 MM UP TO 3 MM\par -  NEGATIVE FOR MYOMETRIAL INVASION\par -  NEGATIVE FOR ENDOCERVICAL INVOLVEMENT\par -  NEGATIVE FOR LYMPHOVASCULAR INVASION\par -  P53 IMMUNOHISTOCHEMICAL STAIN IS POSITIVE, SUPPORTING THE DIAGNOSIS\par -  MISMATCH REPAIR (MMR) TESTING IS BEST DONE ON THE PATIENT’S ENDOMETRIAL BIOPSY SPECIMEN ([place] [id number]) AS THE RESIDUAL FOCI IN THE CURRENT SPECIMEN ARE SMALL AND SOME ARE NECROTIC\par -  BILATERAL FALLOPIAN TUBES WITH INTRALUMINAL SEROUS CARCINOMA (INTRALUMINAL TUMOR CELLS), CONSISTENT WITH ENDOMETRIAL ORIGIN, THE LARGEST FRAGMENT IS 1.5 MM\par -  PELVIC WASHINGS ():  POSITIVE\par 	-  BILATERAL PARAMETRIAL TISSUE, NEGATIVE FOR CARCINOMA\par 	-  BENIGN ENDOMETRIAL POLYP (1.1 CM)\par 	-  INACTIVE ENDOMETRIUM WITH CYSTIC CHANGE\par 	-  UTERINE LEIOMYOMAS, INTRAMURAL AND SUBSEROSAL, THE LARGEST 2 CM\par -  BILATERAL BENIGN OVARIES\par -  BENIGN CERVIX WITH SMALL MUCOUS CYSTS\par \par B. OMENTUM: OMENTECTOMY\par -  OMENTAL TISSUE, NEGATIVE FOR CARCINOMA\par \par C. LEFT EXTERNAL ILIAC SENTINEL NODES:\par -  3 LYMPH NODES, NEGATIVE FOR CARCINOMA ON H&E AND CYTOKERATIN STAINS (0/3)\par D. RIGHT PELVIC SENTINEL NODES:\par -  3 LYMPH NODES, NEGATIVE FOR CARCINOMA ON H&E AND CYTOKERATIN STAINS (0/3) \par E. RIGHT PARA-AORTIC SENTINEL NODES:\par -  1 LYMPH NODE, NEGATIVE FOR CARCINOMA ON H&E AND CYTOKERATIN STAINS (0/1)\par  \par Cancer Staging \par  Pathologic Staging (pTNM,AJCC 8th ed):  pT1a N0(sn) Stage IA\par Positive pelvic washings and intraluminal involvement of fallopian tubes does not alter staging. \par \par On 9/4/2020 she had a CT of her chest, abdomen, and pelvis which revealed no CT evidence of metastatic disease. No lymphadenopathy or ascites. Minimal reticular nodular densities and ground glass changes left upper lobe and superior segment left lower lobe, most likely infectious inflammatory. Incidental cholelithiasis, punctate nodule in the upper pole renal calculi. \par \par She saw Dr. Dumont on 10/9/2020. She recommended 6 cycles of carboplatin and paclitaxel. Her Mother's has Ashkenazi Taoist Heritage, she has been referred to genetic counseling. \par \par She is here to discuss vaginal cuff brachytherapy. Per Dr. Wertheim's note the patient may be getting a second opinion from [facility] Dr. Gil per her families request. She denies bleeding post surgery, other than some vaginal spotting. Denies urinary or bowel issues. \par \par \par 12/18/2020\par Patient was last seen in the office on 10/19/2020 at which time the impression and plan was as follows:\par Stage Ia adenocarcinoma of endometrium, status post CARMEN/BSO procedure. We had a long discussion with the patient regarding the management. We reviewed the pathology that showed a high-grade serous carcinoma with implants noted in fallopian tube as well as in the cavity of uterus. Given her pathology, we recommended that treating her with systemic chemotherapy to improve survival. We also favored using high-dose-rate brachytherapy to the upper part of the vagina to decrease local recurrence. We recommended sandwiching the radiation therapy in between her third and fourth cycle of systemic chemotherapy. We discussed the technical details including the need for insertion of customized applicator, CT simulation, treatment planning, verification and delivery. We recommended treating her to a dose of 2100 cGy in 3 fractions. We discussed the expected short-term side effects including burning sensation, increased frequency of urination and nausea. We discussed the long-term side effects including the risk of vaginal shortening, injury to the bladder, rectal ulceration and subacute intestinal obstruction. We indicated a 1 to 2% probability of grade 3 or higher delayed toxicity. \par \par She has completed 3 cycles of chemotherapy,carboplatin and paclitaxel, with Dr. Dumont and she is here to get started on radiation prior to completing her next 3 cycles of chemotherapy. She has been tolerating the chemotherapy well, she complains of increased fatigue. Denies pain. She alternates between diarrhea and constipation. She denies any urinary issues. No BLE edema present. Denies any N/V at this time. \par \par 3/23/2021\par She completed 3 fractions, HDR 2100cGy on 1/19/21, she is here for a follow up.\par  Dr. Dumont and chemotherapy completed 4 weeks ago. She completed 6 cycles. She states she is feeling very weak and tires. The nausea and vomiting has subsided. She denies any pain, vaginal discharge or bleeding and urinary symptoms  She has a good appetite and is hydrating well. She is seeing Dr. Dumont on 4/9/2021\par \par 9/14/2021\par Ms. Mancini presents today for a f/u.  Stage I high-grade serous endometrial cancer, status post CARMEN/BSO 6 cycles of chemotherapy and vaginal cuff brachytherapy completed on 1/19/2021. She was last seen in the office on 3/2021 with no evidence of disease. She is going to have a CT of the chest soon. She saw Dr. Dumont on 7/13/21 who is monitoring her CA-125 levels. She has not followed up with GYN oncology. She denies any pain, vaginal discharge, bleeding, urinary, or bowel symptoms. She eating and drinking well. She is not sexually active. \par \par 9/22/2022\par Ms. Mancini presents today for a f/u.  Stage I high-grade serous endometrial cancer, status post CARMEN/BSO 6 cycles of chemotherapy and vaginal cuff brachytherapy completed on 1/19/2021. She was last seen in the office on 9/2021 with no evidence of disease. She saw Dr. Curtis on 8/3/2022 and she ordered at CT scan. \par 8/10/2022 CT A/P revealed Stable CT abdomen / pelvis without evidence of metastatic disease. \par Cholelithiasis. Mild sigmoid diverticulosis. \par She saw Dr. Dumont on 7/6/2022,   Patient denies any pain, vaginal discharge or bleeding. She is eating well, feeling fine.

## 2023-04-27 ENCOUNTER — APPOINTMENT (OUTPATIENT)
Dept: GYNECOLOGIC ONCOLOGY | Facility: CLINIC | Age: 73
End: 2023-04-27
Payer: MEDICARE

## 2023-04-27 PROCEDURE — 99213 OFFICE O/P EST LOW 20 MIN: CPT

## 2023-10-04 ENCOUNTER — APPOINTMENT (OUTPATIENT)
Dept: GYNECOLOGIC ONCOLOGY | Facility: CLINIC | Age: 73
End: 2023-10-04
Payer: MEDICARE

## 2023-10-04 PROCEDURE — 99213 OFFICE O/P EST LOW 20 MIN: CPT

## 2024-04-25 ENCOUNTER — APPOINTMENT (OUTPATIENT)
Dept: GYNECOLOGIC ONCOLOGY | Facility: CLINIC | Age: 74
End: 2024-04-25
Payer: MEDICARE

## 2024-04-25 DIAGNOSIS — C54.1 MALIGNANT NEOPLASM OF ENDOMETRIUM: ICD-10-CM

## 2024-04-25 PROCEDURE — 99213 OFFICE O/P EST LOW 20 MIN: CPT

## 2024-04-25 PROCEDURE — G2211 COMPLEX E/M VISIT ADD ON: CPT

## 2024-10-02 ENCOUNTER — APPOINTMENT (OUTPATIENT)
Dept: GYNECOLOGIC ONCOLOGY | Facility: CLINIC | Age: 74
End: 2024-10-02
Payer: MEDICARE

## 2024-10-02 DIAGNOSIS — C54.1 MALIGNANT NEOPLASM OF ENDOMETRIUM: ICD-10-CM

## 2024-10-02 PROCEDURE — G2211 COMPLEX E/M VISIT ADD ON: CPT

## 2024-10-02 PROCEDURE — 99215 OFFICE O/P EST HI 40 MIN: CPT

## 2024-10-02 PROCEDURE — 99459 PELVIC EXAMINATION: CPT

## 2025-04-14 ENCOUNTER — APPOINTMENT (OUTPATIENT)
Dept: GYNECOLOGIC ONCOLOGY | Facility: CLINIC | Age: 75
End: 2025-04-14

## 2025-07-11 ENCOUNTER — APPOINTMENT (OUTPATIENT)
Dept: GYNECOLOGIC ONCOLOGY | Facility: CLINIC | Age: 75
End: 2025-07-11
Payer: MEDICARE

## 2025-07-11 VITALS — OXYGEN SATURATION: 98 % | DIASTOLIC BLOOD PRESSURE: 71 MMHG | SYSTOLIC BLOOD PRESSURE: 109 MMHG | HEART RATE: 87 BPM

## 2025-07-11 PROCEDURE — 99215 OFFICE O/P EST HI 40 MIN: CPT

## 2025-07-11 PROCEDURE — 99459 PELVIC EXAMINATION: CPT

## 2025-08-15 ENCOUNTER — RESULT REVIEW (OUTPATIENT)
Age: 75
End: 2025-08-15